# Patient Record
Sex: FEMALE | Race: WHITE | NOT HISPANIC OR LATINO | URBAN - NONMETROPOLITAN AREA
[De-identification: names, ages, dates, MRNs, and addresses within clinical notes are randomized per-mention and may not be internally consistent; named-entity substitution may affect disease eponyms.]

---

## 2021-02-22 ENCOUNTER — ERX REFILL RESPONSE (OUTPATIENT)
Dept: URBAN - NONMETROPOLITAN AREA CLINIC 2 | Facility: CLINIC | Age: 61
End: 2021-02-22

## 2021-02-22 RX ORDER — OMEPRAZOLE 20 MG/1
TAKE 1 CAPSULE BY MOUTH ONCE DAILY BEFORE  A  MEAL CAPSULE, DELAYED RELEASE ORAL
Qty: 90 | Refills: 3

## 2022-04-28 ENCOUNTER — WEB ENCOUNTER (OUTPATIENT)
Dept: URBAN - NONMETROPOLITAN AREA CLINIC 2 | Facility: CLINIC | Age: 62
End: 2022-04-28

## 2022-05-02 ENCOUNTER — OFFICE VISIT (OUTPATIENT)
Dept: URBAN - NONMETROPOLITAN AREA CLINIC 2 | Facility: CLINIC | Age: 62
End: 2022-05-02
Payer: COMMERCIAL

## 2022-05-02 ENCOUNTER — LAB OUTSIDE AN ENCOUNTER (OUTPATIENT)
Dept: URBAN - NONMETROPOLITAN AREA CLINIC 2 | Facility: CLINIC | Age: 62
End: 2022-05-02

## 2022-05-02 VITALS
SYSTOLIC BLOOD PRESSURE: 109 MMHG | DIASTOLIC BLOOD PRESSURE: 77 MMHG | TEMPERATURE: 97.6 F | HEART RATE: 72 BPM | BODY MASS INDEX: 21.82 KG/M2 | HEIGHT: 64 IN | WEIGHT: 127.8 LBS

## 2022-05-02 DIAGNOSIS — R10.9 ABDOMINAL PAIN: ICD-10-CM

## 2022-05-02 DIAGNOSIS — K22.70 BARRETT'S ESOPHAGUS WITHOUT DYSPLASIA: ICD-10-CM

## 2022-05-02 DIAGNOSIS — Z12.11 ROUTINE COLON: ICD-10-CM

## 2022-05-02 DIAGNOSIS — K21.9 GERD (GASTROESOPHAGEAL REFLUX DISEASE): ICD-10-CM

## 2022-05-02 PROCEDURE — 99214 OFFICE O/P EST MOD 30 MIN: CPT | Performed by: NURSE PRACTITIONER

## 2022-05-02 RX ORDER — ALENDRONATE SODIUM 70 MG/1
1 TABLET 30 MINUTES BEFORE THE FIRST FOOD, BEVERAGE OR MEDICINE OF THE DAY WITH PLAIN WATER TABLET ORAL
Status: ACTIVE | COMMUNITY

## 2022-05-02 RX ORDER — INSULIN GLARGINE 300 U/ML
INJECTION, SOLUTION SUBCUTANEOUS
Qty: 0 | Refills: 0 | Status: ACTIVE | COMMUNITY
Start: 1900-01-01

## 2022-05-02 RX ORDER — OMEPRAZOLE 20 MG/1
1 CAPSULE 30 MINUTES BEFORE MORNING MEAL CAPSULE, DELAYED RELEASE ORAL ONCE A DAY
Qty: 90 CAPSULES | Refills: 3

## 2022-05-02 RX ORDER — OMEPRAZOLE 20 MG/1
TAKE 1 CAPSULE BY MOUTH ONCE DAILY BEFORE  A  MEAL CAPSULE, DELAYED RELEASE ORAL
Qty: 90 | Refills: 3 | Status: ACTIVE | COMMUNITY

## 2022-05-02 NOTE — HPI-TODAY'S VISIT:
Lupe presents for follow up of reflux, Barretts, and colon cancer screening. Since her last visit she has been doing well on omeprazole 20mg daily. She has no breakthrough reflux. She is now on bisophosphonates for osteoporosis. She has been tolerating these well. Her last EGD was in 2018 with metaplasia. She is due for repeat surveillance. Her colonoscopy was in 2018 and normal. Today she is doing well. MB

## 2022-08-29 ENCOUNTER — OFFICE VISIT (OUTPATIENT)
Dept: URBAN - NONMETROPOLITAN AREA SURGERY CENTER 1 | Facility: SURGERY CENTER | Age: 62
End: 2022-08-29
Payer: COMMERCIAL

## 2022-08-29 ENCOUNTER — CLAIMS CREATED FROM THE CLAIM WINDOW (OUTPATIENT)
Dept: URBAN - METROPOLITAN AREA CLINIC 4 | Facility: CLINIC | Age: 62
End: 2022-08-29
Payer: COMMERCIAL

## 2022-08-29 DIAGNOSIS — K31.89 OTHER DISEASES OF STOMACH AND DUODENUM: ICD-10-CM

## 2022-08-29 DIAGNOSIS — K21.9 GASTRO-ESOPHAGEAL REFLUX DISEASE WITHOUT ESOPHAGITIS: ICD-10-CM

## 2022-08-29 DIAGNOSIS — K29.70 CHRONIC ACITVE GASTRITIS (H.PYLORI NEGATIVE): ICD-10-CM

## 2022-08-29 DIAGNOSIS — K29.70 GASTRITIS, UNSPECIFIED, WITHOUT BLEEDING: ICD-10-CM

## 2022-08-29 DIAGNOSIS — R12 BURNING REFLUX: ICD-10-CM

## 2022-08-29 DIAGNOSIS — K31.89 ACQUIRED DEFORMITY OF DUODENUM: ICD-10-CM

## 2022-08-29 DIAGNOSIS — K21.00 ALKALINE REFLUX ESOPHAGITIS: ICD-10-CM

## 2022-08-29 PROCEDURE — 88305 TISSUE EXAM BY PATHOLOGIST: CPT | Performed by: PATHOLOGY

## 2022-08-29 PROCEDURE — 88342 IMHCHEM/IMCYTCHM 1ST ANTB: CPT | Performed by: PATHOLOGY

## 2022-08-29 PROCEDURE — G8907 PT DOC NO EVENTS ON DISCHARG: HCPCS | Performed by: INTERNAL MEDICINE

## 2022-08-29 PROCEDURE — 43239 EGD BIOPSY SINGLE/MULTIPLE: CPT | Performed by: INTERNAL MEDICINE

## 2022-08-29 PROCEDURE — 88312 SPECIAL STAINS GROUP 1: CPT | Performed by: PATHOLOGY

## 2022-09-30 ENCOUNTER — OFFICE VISIT (OUTPATIENT)
Dept: URBAN - NONMETROPOLITAN AREA CLINIC 2 | Facility: CLINIC | Age: 62
End: 2022-09-30

## 2022-11-29 ENCOUNTER — OFFICE VISIT (OUTPATIENT)
Dept: URBAN - NONMETROPOLITAN AREA CLINIC 2 | Facility: CLINIC | Age: 62
End: 2022-11-29
Payer: COMMERCIAL

## 2022-11-29 VITALS
HEIGHT: 64 IN | SYSTOLIC BLOOD PRESSURE: 123 MMHG | TEMPERATURE: 98.6 F | DIASTOLIC BLOOD PRESSURE: 78 MMHG | HEART RATE: 76 BPM | BODY MASS INDEX: 22.2 KG/M2 | WEIGHT: 130 LBS

## 2022-11-29 DIAGNOSIS — K22.70 BARRETT'S ESOPHAGUS WITHOUT DYSPLASIA: ICD-10-CM

## 2022-11-29 DIAGNOSIS — K21.9 GERD (GASTROESOPHAGEAL REFLUX DISEASE): ICD-10-CM

## 2022-11-29 DIAGNOSIS — R10.9 ABDOMINAL PAIN: ICD-10-CM

## 2022-11-29 DIAGNOSIS — Z12.11 ROUTINE COLON: ICD-10-CM

## 2022-11-29 PROBLEM — 302914006: Status: ACTIVE | Noted: 2022-05-02

## 2022-11-29 PROCEDURE — 99214 OFFICE O/P EST MOD 30 MIN: CPT | Performed by: NURSE PRACTITIONER

## 2022-11-29 RX ORDER — ALENDRONATE SODIUM 70 MG/1
1 TABLET 30 MINUTES BEFORE THE FIRST FOOD, BEVERAGE OR MEDICINE OF THE DAY WITH PLAIN WATER TABLET ORAL
Status: ACTIVE | COMMUNITY

## 2022-11-29 RX ORDER — INSULIN GLARGINE 300 U/ML
INJECTION, SOLUTION SUBCUTANEOUS
Qty: 0 | Refills: 0 | Status: ACTIVE | COMMUNITY
Start: 1900-01-01

## 2022-11-29 RX ORDER — OMEPRAZOLE 20 MG/1
1 CAPSULE 30 MINUTES BEFORE MORNING MEAL CAPSULE, DELAYED RELEASE ORAL ONCE A DAY
Qty: 90 CAPSULES | Refills: 3 | Status: ACTIVE | COMMUNITY

## 2022-11-29 RX ORDER — ATORVASTATIN CALCIUM 10 MG/1
1 TABLET TABLET, FILM COATED ORAL ONCE A DAY
Status: ACTIVE | COMMUNITY

## 2022-11-29 NOTE — HPI-TODAY'S VISIT:
Lupe presents for follow up of reflux, Barretts, and colon cancer screening. Since her last visit she has been doing well on omeprazole 20mg daily. She has no breakthrough reflux. She is now on bisophosphonates for osteoporosis. She has been tolerating these well. Her last EGD was in 2018 with metaplasia. She is due for repeat surveillance. Her colonoscopy was in 2018 and normal. Today she is doing well. MB 11/29/2022 Lupe presents for endoscopy follow-up.  Her repeat EGD shows normal Z-line and no metaplasia.  She is on omeprazole 20 mg daily with no significant breakthrough, when she does have symptoms she takes Pepcid in the evening.  She is due for a bone density scan.  Today she is doing well otherwise with no new GI complaints.  MB

## 2023-08-16 ENCOUNTER — ERX REFILL RESPONSE (OUTPATIENT)
Dept: URBAN - NONMETROPOLITAN AREA CLINIC 2 | Facility: CLINIC | Age: 63
End: 2023-08-16

## 2023-08-16 RX ORDER — OMEPRAZOLE 20 MG/1
1 CAPSULE 30 MINUTES BEFORE MORNING MEAL CAPSULE, DELAYED RELEASE ORAL ONCE A DAY
Qty: 90 CAPSULES | Refills: 3 | OUTPATIENT

## 2024-03-19 ENCOUNTER — OV EP (OUTPATIENT)
Dept: URBAN - NONMETROPOLITAN AREA CLINIC 2 | Facility: CLINIC | Age: 64
End: 2024-03-19
Payer: COMMERCIAL

## 2024-03-19 ENCOUNTER — LAB (OUTPATIENT)
Dept: URBAN - NONMETROPOLITAN AREA CLINIC 2 | Facility: CLINIC | Age: 64
End: 2024-03-19

## 2024-03-19 VITALS
HEIGHT: 64 IN | TEMPERATURE: 98.6 F | SYSTOLIC BLOOD PRESSURE: 129 MMHG | WEIGHT: 126 LBS | DIASTOLIC BLOOD PRESSURE: 84 MMHG | HEART RATE: 80 BPM | BODY MASS INDEX: 21.51 KG/M2

## 2024-03-19 DIAGNOSIS — K21.9 GERD (GASTROESOPHAGEAL REFLUX DISEASE): ICD-10-CM

## 2024-03-19 DIAGNOSIS — Z12.11 ROUTINE COLON: ICD-10-CM

## 2024-03-19 DIAGNOSIS — K22.70 BARRETT'S ESOPHAGUS WITHOUT DYSPLASIA: ICD-10-CM

## 2024-03-19 DIAGNOSIS — R10.9 LEFT SIDED ABDOMINAL PAIN: ICD-10-CM

## 2024-03-19 DIAGNOSIS — R19.4 CHANGE IN BOWEL HABITS: ICD-10-CM

## 2024-03-19 PROBLEM — 285387005: Status: ACTIVE | Noted: 2024-03-19

## 2024-03-19 PROBLEM — 88111009: Status: ACTIVE | Noted: 2024-03-19

## 2024-03-19 PROCEDURE — 99214 OFFICE O/P EST MOD 30 MIN: CPT | Performed by: NURSE PRACTITIONER

## 2024-03-19 RX ORDER — OMEPRAZOLE 20 MG/1
1 CAPSULE 30 MINUTES BEFORE MORNING MEAL CAPSULE, DELAYED RELEASE ORAL ONCE A DAY
Qty: 90 CAPSULES | Refills: 3

## 2024-03-19 RX ORDER — OMEPRAZOLE 20 MG/1
1 CAPSULE 30 MINUTES BEFORE MORNING MEAL CAPSULE, DELAYED RELEASE ORAL ONCE A DAY
Qty: 90 CAPSULES | Refills: 3 | Status: ACTIVE | COMMUNITY

## 2024-03-19 RX ORDER — ALENDRONATE SODIUM 70 MG/1
1 TABLET 30 MINUTES BEFORE THE FIRST FOOD, BEVERAGE OR MEDICINE OF THE DAY WITH PLAIN WATER TABLET ORAL
Status: ON HOLD | COMMUNITY

## 2024-03-19 RX ORDER — RISEDRONATE SODIUM 35 MG/1
1 TABLET AT LEAST 30 MINUTES BEFORE THE FIRST FOOD OR DRINK, OTHER THAN WATER, OF THE DAY TABLET, FILM COATED ORAL
Status: ACTIVE | COMMUNITY

## 2024-03-19 RX ORDER — ATORVASTATIN CALCIUM 10 MG/1
1 TABLET TABLET, FILM COATED ORAL ONCE A DAY
Status: ACTIVE | COMMUNITY

## 2024-03-19 RX ORDER — SODIUM, POTASSIUM,MAG SULFATES 17.5-3.13G
177 MILLILITER SOLUTION, RECONSTITUTED, ORAL ORAL
Qty: 1 UNSPECIFIED | Refills: 0 | OUTPATIENT
Start: 2024-03-19 | End: 2024-03-20

## 2024-03-19 RX ORDER — INSULIN GLARGINE 300 U/ML
INJECTION, SOLUTION SUBCUTANEOUS
Qty: 0 | Refills: 0 | Status: ACTIVE | COMMUNITY
Start: 1900-01-01

## 2024-03-19 RX ORDER — FAMOTIDINE 40 MG/1
1 TABLET BEFORE SUPPER TABLET, FILM COATED ORAL ONCE A DAY
Qty: 90 TABLET | Refills: 3 | OUTPATIENT
Start: 2024-03-19

## 2024-03-19 NOTE — HPI-TODAY'S VISIT:
Lupe presents for follow up of reflux, Barretts, and colon cancer screening. Since her last visit she has been doing well on omeprazole 20mg daily. She has no breakthrough reflux. She is now on bisophosphonates for osteoporosis. She has been tolerating these well. Her last EGD was in 2018 with metaplasia. She is due for repeat surveillance. Her colonoscopy was in 2018 and normal. Today she is doing well. MB 11/29/2022 Lupe presents for endoscopy follow-up.  Her repeat EGD shows normal Z-line and no metaplasia.  She is on omeprazole 20 mg daily with no significant breakthrough, when she does have symptoms she takes Pepcid in the evening.  She is due for a bone density scan.  Today she is doing well otherwise with no new GI complaints.  MB 3/19/2024 Lupe presents for follow-up of reflux, history of Selby's, and new onset change in bowel habits with left lower quadrant abdominal pain.  For the last month or so she has had alternating constipation and diarrhea with some tarry stools.  Her last colonoscopy was in 2018 with no diverticular disease.  On exam she has some left-sided/left lower quadrant abdominal pain that appears to be colonic.  She does not take anything for her bowels.  She had a flare of reflux, she is taking her omeprazole 20 mg daily.  Her EGD in 2022 shows no Selby's.  Today she agrees to add famotidine for reflux symptoms, will start psyllium, I am concerned she has diverticular disease.  Will check her labs, consider CT imaging if her pain worsens, otherwise schedule colonoscopy.  If her workup is negative consider a course of Xifaxan for IBS mixed.  MB

## 2024-03-19 NOTE — PHYSICAL EXAM CONSTITUTIONAL:
alert , in no acute distress , well developed, well nourished all other ROS negative except as per HPI

## 2024-03-20 LAB
A/G RATIO: 1.8
ABSOLUTE BASOPHILS: 50
ABSOLUTE EOSINOPHILS: 59
ABSOLUTE LYMPHOCYTES: 1243
ABSOLUTE MONOCYTES: 466
ABSOLUTE NEUTROPHILS: 2381
ALBUMIN: 4.2
ALKALINE PHOSPHATASE: 49
ALT (SGPT): 16
AST (SGOT): 18
BASOPHILS: 1.2
BILIRUBIN, TOTAL: 0.6
BUN/CREATININE RATIO: (no result)
BUN: 17
C-REACTIVE PROTEIN, QUANT: 0.4
CALCIUM: 9.1
CARBON DIOXIDE, TOTAL: 28
CHLORIDE: 104
CREATININE: 0.6
EGFR: 101
EOSINOPHILS: 1.4
GLOBULIN, TOTAL: 2.4
GLUCOSE: 142
HEMATOCRIT: 40.2
HEMOGLOBIN: 13.4
LIPASE: 20
LYMPHOCYTES: 29.6
MCH: 30.4
MCHC: 33.3
MCV: 91.2
MONOCYTES: 11.1
MPV: 9.5
NEUTROPHILS: 56.7
PLATELET COUNT: 206
POTASSIUM: 4.5
PROTEIN, TOTAL: 6.6
RDW: 11.3
RED BLOOD CELL COUNT: 4.41
SED RATE BY MODIFIED: 2
SODIUM: 141
WHITE BLOOD CELL COUNT: 4.2

## 2024-07-12 ENCOUNTER — OFFICE VISIT (OUTPATIENT)
Dept: URBAN - NONMETROPOLITAN AREA SURGERY CENTER 1 | Facility: SURGERY CENTER | Age: 64
End: 2024-07-12
Payer: COMMERCIAL

## 2024-07-12 DIAGNOSIS — R19.4 ALTERATION IN BOWEL ELIMINATION: ICD-10-CM

## 2024-07-12 DIAGNOSIS — Z12.11 ENCOUNTER SCREENING FOR MALIGNANT NEOPLASM OF COLON: ICD-10-CM

## 2024-07-12 PROCEDURE — 45378 DIAGNOSTIC COLONOSCOPY: CPT | Performed by: INTERNAL MEDICINE

## 2024-07-12 PROCEDURE — 00812 ANES LWR INTST SCR COLSC: CPT | Performed by: NURSE ANESTHETIST, CERTIFIED REGISTERED

## 2024-07-12 RX ORDER — FAMOTIDINE 40 MG/1
1 TABLET BEFORE SUPPER TABLET, FILM COATED ORAL ONCE A DAY
Qty: 90 TABLET | Refills: 3 | Status: ACTIVE | COMMUNITY
Start: 2024-03-19

## 2024-07-12 RX ORDER — OMEPRAZOLE 20 MG/1
1 CAPSULE 30 MINUTES BEFORE MORNING MEAL CAPSULE, DELAYED RELEASE ORAL ONCE A DAY
Qty: 90 CAPSULES | Refills: 3 | Status: ACTIVE | COMMUNITY

## 2024-07-12 RX ORDER — ALENDRONATE SODIUM 70 MG/1
1 TABLET 30 MINUTES BEFORE THE FIRST FOOD, BEVERAGE OR MEDICINE OF THE DAY WITH PLAIN WATER TABLET ORAL
Status: ON HOLD | COMMUNITY

## 2024-07-12 RX ORDER — ATORVASTATIN CALCIUM 10 MG/1
1 TABLET TABLET, FILM COATED ORAL ONCE A DAY
Status: ACTIVE | COMMUNITY

## 2024-07-12 RX ORDER — INSULIN GLARGINE 300 U/ML
INJECTION, SOLUTION SUBCUTANEOUS
Qty: 0 | Refills: 0 | Status: ACTIVE | COMMUNITY
Start: 1900-01-01

## 2024-07-12 RX ORDER — RISEDRONATE SODIUM 35 MG/1
1 TABLET AT LEAST 30 MINUTES BEFORE THE FIRST FOOD OR DRINK, OTHER THAN WATER, OF THE DAY TABLET, FILM COATED ORAL
Status: ACTIVE | COMMUNITY

## 2024-08-26 ENCOUNTER — OFFICE VISIT (OUTPATIENT)
Dept: URBAN - NONMETROPOLITAN AREA CLINIC 2 | Facility: CLINIC | Age: 64
End: 2024-08-26
Payer: COMMERCIAL

## 2024-08-26 ENCOUNTER — DASHBOARD ENCOUNTERS (OUTPATIENT)
Age: 64
End: 2024-08-26

## 2024-08-26 ENCOUNTER — LAB OUTSIDE AN ENCOUNTER (OUTPATIENT)
Dept: URBAN - NONMETROPOLITAN AREA CLINIC 2 | Facility: CLINIC | Age: 64
End: 2024-08-26

## 2024-08-26 VITALS
DIASTOLIC BLOOD PRESSURE: 76 MMHG | BODY MASS INDEX: 21.51 KG/M2 | HEIGHT: 64 IN | WEIGHT: 126 LBS | SYSTOLIC BLOOD PRESSURE: 148 MMHG | HEART RATE: 69 BPM

## 2024-08-26 DIAGNOSIS — K21.9 GERD (GASTROESOPHAGEAL REFLUX DISEASE): ICD-10-CM

## 2024-08-26 DIAGNOSIS — R19.4 CHANGE IN BOWEL HABITS: ICD-10-CM

## 2024-08-26 PROCEDURE — 99214 OFFICE O/P EST MOD 30 MIN: CPT | Performed by: NURSE PRACTITIONER

## 2024-08-26 RX ORDER — RISEDRONATE SODIUM 35 MG/1
1 TABLET AT LEAST 30 MINUTES BEFORE THE FIRST FOOD OR DRINK, OTHER THAN WATER, OF THE DAY TABLET, FILM COATED ORAL
Status: ACTIVE | COMMUNITY

## 2024-08-26 RX ORDER — FAMOTIDINE 40 MG/1
1 TABLET BEFORE SUPPER TABLET, FILM COATED ORAL ONCE A DAY
Qty: 90 TABLET | Refills: 3 | Status: ACTIVE | COMMUNITY
Start: 2024-03-19

## 2024-08-26 RX ORDER — ATORVASTATIN CALCIUM 10 MG/1
1 TABLET TABLET, FILM COATED ORAL ONCE A DAY
Status: ACTIVE | COMMUNITY

## 2024-08-26 RX ORDER — ALENDRONATE SODIUM 70 MG/1
1 TABLET 30 MINUTES BEFORE THE FIRST FOOD, BEVERAGE OR MEDICINE OF THE DAY WITH PLAIN WATER TABLET ORAL
Status: ON HOLD | COMMUNITY

## 2024-08-26 RX ORDER — OMEPRAZOLE 20 MG/1
1 CAPSULE 30 MINUTES BEFORE MORNING MEAL CAPSULE, DELAYED RELEASE ORAL ONCE A DAY
Qty: 90 CAPSULES | Refills: 3 | Status: ACTIVE | COMMUNITY

## 2024-08-26 RX ORDER — INSULIN GLARGINE 300 U/ML
INJECTION, SOLUTION SUBCUTANEOUS
Qty: 0 | Refills: 0 | Status: ACTIVE | COMMUNITY
Start: 1900-01-01

## 2024-08-26 NOTE — HPI-TODAY'S VISIT:
Lupe presents for follow up of reflux, Barretts, and colon cancer screening. Since her last visit she has been doing well on omeprazole 20mg daily. She has no breakthrough reflux. She is now on bisophosphonates for osteoporosis. She has been tolerating these well. Her last EGD was in 2018 with metaplasia. She is due for repeat surveillance. Her colonoscopy was in 2018 and normal. Today she is doing well. MB 11/29/2022 Lupe presents for endoscopy follow-up.  Her repeat EGD shows normal Z-line and no metaplasia.  She is on omeprazole 20 mg daily with no significant breakthrough, when she does have symptoms she takes Pepcid in the evening.  She is due for a bone density scan.  Today she is doing well otherwise with no new GI complaints.  MB 3/19/2024 Lupe presents for follow-up of reflux, history of Selby's, and new onset change in bowel habits with left lower quadrant abdominal pain.  For the last month or so she has had alternating constipation and diarrhea with some tarry stools.  Her last colonoscopy was in 2018 with no diverticular disease.  On exam she has some left-sided/left lower quadrant abdominal pain that appears to be colonic.  She does not take anything for her bowels.  She had a flare of reflux, she is taking her omeprazole 20 mg daily.  Her EGD in 2022 shows no Selby's.  Today she agrees to add famotidine for reflux symptoms, will start psyllium, I am concerned she has diverticular disease.  Will check her labs, consider CT imaging if her pain worsens, otherwise schedule colonoscopy.  If her workup is negative consider a course of Xifaxan for IBS mixed.  MB 8/26/2024 Lupe presents for follow-up.  Her colonoscopy is normal with no diverticular disease.  Her bowels are moving more regularly on psyllium.  She still has some gas trapping at times.  She still having significant left upper quadrant/left-sided abdominal pain, this may be positional.  Schedule CT scan to rule out pancreatobiliary issue or any musculoskeletal issue like an intra-abdominal hernia.  If this is negative IBgard as needed plus or minus Afaxin.  She may have a component of costochondritis as well.  MB

## 2024-09-06 ENCOUNTER — WEB ENCOUNTER (OUTPATIENT)
Dept: URBAN - NONMETROPOLITAN AREA CLINIC 2 | Facility: CLINIC | Age: 64
End: 2024-09-06

## 2024-09-26 ENCOUNTER — TELEPHONE ENCOUNTER (OUTPATIENT)
Dept: URBAN - NONMETROPOLITAN AREA CLINIC 2 | Facility: CLINIC | Age: 64
End: 2024-09-26

## 2025-02-24 ENCOUNTER — LAB OUTSIDE AN ENCOUNTER (OUTPATIENT)
Dept: URBAN - NONMETROPOLITAN AREA CLINIC 2 | Facility: CLINIC | Age: 65
End: 2025-02-24

## 2025-02-24 ENCOUNTER — OFFICE VISIT (OUTPATIENT)
Dept: URBAN - NONMETROPOLITAN AREA CLINIC 2 | Facility: CLINIC | Age: 65
End: 2025-02-24
Payer: COMMERCIAL

## 2025-02-24 VITALS
HEART RATE: 80 BPM | HEIGHT: 64 IN | DIASTOLIC BLOOD PRESSURE: 78 MMHG | BODY MASS INDEX: 21.51 KG/M2 | SYSTOLIC BLOOD PRESSURE: 130 MMHG | WEIGHT: 126 LBS

## 2025-02-24 DIAGNOSIS — K22.70 BARRETT'S ESOPHAGUS WITHOUT DYSPLASIA: ICD-10-CM

## 2025-02-24 DIAGNOSIS — K62.89 PROCTITIS: ICD-10-CM

## 2025-02-24 DIAGNOSIS — R10.32 ABDOMINAL CRAMPING IN LEFT LOWER QUADRANT: ICD-10-CM

## 2025-02-24 DIAGNOSIS — R19.4 CHANGE IN BOWEL HABITS: ICD-10-CM

## 2025-02-24 DIAGNOSIS — K21.9 GERD (GASTROESOPHAGEAL REFLUX DISEASE): ICD-10-CM

## 2025-02-24 DIAGNOSIS — Z12.11 COLON CANCER SCREENING: ICD-10-CM

## 2025-02-24 PROBLEM — 3951002: Status: ACTIVE | Noted: 2025-02-24

## 2025-02-24 PROBLEM — 305058001: Status: ACTIVE | Noted: 2025-02-24

## 2025-02-24 PROCEDURE — 99214 OFFICE O/P EST MOD 30 MIN: CPT | Performed by: NURSE PRACTITIONER

## 2025-02-24 RX ORDER — ALENDRONATE SODIUM 70 MG/1
1 TABLET 30 MINUTES BEFORE THE FIRST FOOD, BEVERAGE OR MEDICINE OF THE DAY WITH PLAIN WATER TABLET ORAL
Status: ON HOLD | COMMUNITY

## 2025-02-24 RX ORDER — INSULIN GLARGINE 300 U/ML
INJECTION, SOLUTION SUBCUTANEOUS
Qty: 0 | Refills: 0 | Status: ACTIVE | COMMUNITY
Start: 1900-01-01

## 2025-02-24 RX ORDER — OMEPRAZOLE 20 MG/1
1 CAPSULE 30 MINUTES BEFORE MORNING MEAL CAPSULE, DELAYED RELEASE ORAL ONCE A DAY
Qty: 90 CAPSULES | Refills: 3 | Status: ACTIVE | COMMUNITY

## 2025-02-24 RX ORDER — FAMOTIDINE 40 MG/1
1 TABLET BEFORE SUPPER TABLET, FILM COATED ORAL ONCE A DAY
Qty: 90 TABLET | Refills: 3 | Status: ACTIVE | COMMUNITY
Start: 2024-03-19

## 2025-02-24 RX ORDER — OMEPRAZOLE 20 MG/1
1 CAPSULE 30 MINUTES BEFORE MORNING MEAL CAPSULE, DELAYED RELEASE ORAL ONCE A DAY
Qty: 90 CAPSULES | Refills: 3

## 2025-02-24 RX ORDER — FAMOTIDINE 40 MG/1
1 TABLET BEFORE SUPPER TABLET, FILM COATED ORAL ONCE A DAY
Qty: 90 TABLET | Refills: 3
Start: 2024-03-19

## 2025-02-24 RX ORDER — ATORVASTATIN CALCIUM 10 MG/1
1 TABLET TABLET, FILM COATED ORAL ONCE A DAY
Status: ACTIVE | COMMUNITY

## 2025-02-24 RX ORDER — RISEDRONATE SODIUM 35 MG/1
1 TABLET AT LEAST 30 MINUTES BEFORE THE FIRST FOOD OR DRINK, OTHER THAN WATER, OF THE DAY TABLET, FILM COATED ORAL
Status: ACTIVE | COMMUNITY

## 2025-02-24 NOTE — HPI-TODAY'S VISIT:
Lupe presents for follow up of reflux, Barretts, and colon cancer screening. Since her last visit she has been doing well on omeprazole 20mg daily. She has no breakthrough reflux. She is now on bisophosphonates for osteoporosis. She has been tolerating these well. Her last EGD was in 2018 with metaplasia. She is due for repeat surveillance. Her colonoscopy was in 2018 and normal. Today she is doing well. MB 11/29/2022 Lupe presents for endoscopy follow-up.  Her repeat EGD shows normal Z-line and no metaplasia.  She is on omeprazole 20 mg daily with no significant breakthrough, when she does have symptoms she takes Pepcid in the evening.  She is due for a bone density scan.  Today she is doing well otherwise with no new GI complaints.  MB 3/19/2024 Lupe presents for follow-up of reflux, history of Selby's, and new onset change in bowel habits with left lower quadrant abdominal pain.  For the last month or so she has had alternating constipation and diarrhea with some tarry stools.  Her last colonoscopy was in 2018 with no diverticular disease.  On exam she has some left-sided/left lower quadrant abdominal pain that appears to be colonic.  She does not take anything for her bowels.  She had a flare of reflux, she is taking her omeprazole 20 mg daily.  Her EGD in 2022 shows no Selby's.  Today she agrees to add famotidine for reflux symptoms, will start psyllium, I am concerned she has diverticular disease.  Will check her labs, consider CT imaging if her pain worsens, otherwise schedule colonoscopy.  If her workup is negative consider a course of Xifaxan for IBS mixed.  MB 8/26/2024 Lupe presents for follow-up.  Her colonoscopy is normal with no diverticular disease.  Her bowels are moving more regularly on psyllium.  She still has some gas trapping at times.  She still having significant left upper quadrant/left-sided abdominal pain, this may be positional.  Schedule CT scan to rule out pancreatobiliary issue or any musculoskeletal issue like an intra-abdominal hernia.  If this is negative IBgard as needed plus or minus Afaxin.  She may have a component of costochondritis as well.  MB 2/24/2025 Lupe presents for follow-up.  Since her last visit her CT is normal other than questionable mild rectal wall thickening with hyperemia.  She had a colonoscopy in July of last year that was normal, the CT scan was done in September.  Her lower abdominal pain abated.  She did discontinue the psyllium.  Her stools are narrow intermittently.  She agrees to restart the psyllium, she is due for her surveillance EGD, we will add a flex sig at the time as well.  Otherwise she has no specific GI complaints today and is compliant with her omeprazole 20 mg daily.  MB

## 2025-08-11 ENCOUNTER — OFFICE VISIT (OUTPATIENT)
Dept: URBAN - NONMETROPOLITAN AREA SURGERY CENTER 1 | Facility: SURGERY CENTER | Age: 65
End: 2025-08-11